# Patient Record
Sex: FEMALE | Race: OTHER | Employment: OTHER | URBAN - METROPOLITAN AREA
[De-identification: names, ages, dates, MRNs, and addresses within clinical notes are randomized per-mention and may not be internally consistent; named-entity substitution may affect disease eponyms.]

---

## 2022-03-18 ENCOUNTER — CONSULTATION/EVALUATION (OUTPATIENT)
Dept: URBAN - METROPOLITAN AREA CLINIC 39 | Facility: CLINIC | Age: 72
End: 2022-03-18

## 2022-03-18 DIAGNOSIS — H04.123: ICD-10-CM

## 2022-03-18 DIAGNOSIS — H25.812: ICD-10-CM

## 2022-03-18 DIAGNOSIS — H25.811: ICD-10-CM

## 2022-03-18 DIAGNOSIS — H18.513: ICD-10-CM

## 2022-03-18 DIAGNOSIS — D31.32: ICD-10-CM

## 2022-03-18 PROCEDURE — 92136TC INTERFEROMETRY - TECHNICAL COMPONENT

## 2022-03-18 PROCEDURE — 92134 CPTRZ OPH DX IMG PST SGM RTA: CPT

## 2022-03-18 PROCEDURE — V2799PMN IMPRIMIS PRED-MOXI-NEPAF 5ML

## 2022-03-18 PROCEDURE — 92015 DETERMINE REFRACTIVE STATE: CPT

## 2022-03-18 PROCEDURE — 92025AV CORNEAL TOPOGRAPHY, AV

## 2022-03-18 PROCEDURE — 92286 ANT SGM IMG I&R SPECLR MIC: CPT

## 2022-03-18 PROCEDURE — 92004 COMPRE OPH EXAM NEW PT 1/>: CPT

## 2022-03-18 ASSESSMENT — VISUAL ACUITY
OD_SC: 20/80-1
OD_RAM: 20/25+2
OS_SC: 20/200-1
OS_SC: J10
OD_SC: J10
OD_CC: 20/30-2
OS_CC: J5
OD_CC: J1+
OS_CC: 20/40-2
OD_BAT: <20/400
OS_AM: 20/20-1
OS_BAT: <20/400

## 2022-03-18 ASSESSMENT — TONOMETRY
OS_IOP_MMHG: 22
OD_IOP_MMHG: 18

## 2022-03-18 NOTE — PATIENT DISCUSSION
The patient expressed a desire to see through the full range of vision from distance, to middle, to near without glasses. The limitations of advanced lens technology were reviewed and the recommendation was made for the Synergy IOL OU. The patient understands there is no guarantee of glasses free vision and the more complete range of vision from the Synergy lens comes with a trade-off. Side effects include halos around point sources of light at night and failure to adapt in 1 in 500 cases resulting in the need for exchange of the lens. Enhancement, including Lasik, may be necessary to achieve the full uncorrected vision potential. The patient elects Synergy emmetropia, goal of emmetropia.

## 2022-03-18 NOTE — PATIENT DISCUSSION
Followed for 15 years by retina specialist in [facility]. Stable as of summer 2021. Patient to continue annual observation.

## 2022-03-25 ENCOUNTER — POST-OP (OUTPATIENT)
Dept: URBAN - METROPOLITAN AREA CLINIC 39 | Facility: CLINIC | Age: 72
End: 2022-03-25

## 2022-03-25 ENCOUNTER — PRE-OP/H&P (OUTPATIENT)
Dept: URBAN - METROPOLITAN AREA CLINIC 39 | Facility: CLINIC | Age: 72
End: 2022-03-25

## 2022-03-25 ENCOUNTER — SURGERY/PROCEDURE (OUTPATIENT)
Dept: URBAN - METROPOLITAN AREA CLINIC 39 | Facility: CLINIC | Age: 72
End: 2022-03-25

## 2022-03-25 DIAGNOSIS — Z96.1: ICD-10-CM

## 2022-03-25 DIAGNOSIS — H25.811: ICD-10-CM

## 2022-03-25 PROCEDURE — 66984AV REMOVE CATARACT, INSERT ADVANCED LENS

## 2022-03-25 PROCEDURE — 66999LNSR LENSAR LASER FOR CAT SX

## 2022-03-25 PROCEDURE — 99211T TECH SERVICE

## 2022-03-25 PROCEDURE — 65772LRI LRI DURING CAT SX

## 2022-03-25 ASSESSMENT — VISUAL ACUITY: OD_SC: 20/200

## 2022-03-25 ASSESSMENT — TONOMETRY: OD_IOP_MMHG: 16

## 2022-03-25 NOTE — PATIENT DISCUSSION
Followed for 15 years by retina specialist in [facility]. Stable as of summer 2021. Patient to continue annual observation.

## 2022-03-25 NOTE — PATIENT DISCUSSION
Followed for 15 years by retina specialist in [facility][note 3]. Stable as of summer 2021. Patient to continue annual observation.

## 2022-03-25 NOTE — PATIENT DISCUSSION
Followed for 15 years by retina specialist in [facility]. Stable as of summer 2021. Patient to continue annual observation.

## 2022-03-28 ENCOUNTER — POST OP/EVAL OF SECOND EYE (OUTPATIENT)
Dept: URBAN - METROPOLITAN AREA CLINIC 40 | Facility: CLINIC | Age: 72
End: 2022-03-28

## 2022-03-28 DIAGNOSIS — H25.812: ICD-10-CM

## 2022-03-28 DIAGNOSIS — H18.513: ICD-10-CM

## 2022-03-28 DIAGNOSIS — Z96.1: ICD-10-CM

## 2022-03-28 DIAGNOSIS — D31.32: ICD-10-CM

## 2022-03-28 DIAGNOSIS — H26.491: ICD-10-CM

## 2022-03-28 DIAGNOSIS — H04.123: ICD-10-CM

## 2022-03-28 PROCEDURE — 92012 INTRM OPH EXAM EST PATIENT: CPT

## 2022-03-28 PROCEDURE — 99024 POSTOP FOLLOW-UP VISIT: CPT

## 2022-03-28 ASSESSMENT — VISUAL ACUITY
OD_SC: J5
OS_BAT: <20/400
OD_SC: 20/40-1

## 2022-03-28 ASSESSMENT — TONOMETRY
OD_IOP_MMHG: 23
OS_IOP_MMHG: 20

## 2022-03-28 ASSESSMENT — PACHYMETRY
OS_CT_UM: 500
OD_CT_UM: 522

## 2022-03-28 NOTE — PATIENT DISCUSSION
OK to proceed with IOL OS due to FD OS pt elects to proceed and goal is for Synergy OS, goal of emmetropia.

## 2022-03-28 NOTE — PATIENT DISCUSSION
Followed for 15 years by retina specialist in [facility]. Stable as of summer 2021. Patient to continue annual observation.

## 2022-04-06 ENCOUNTER — SURGERY/PROCEDURE (OUTPATIENT)
Dept: URBAN - METROPOLITAN AREA CLINIC 39 | Facility: CLINIC | Age: 72
End: 2022-04-06

## 2022-04-06 ENCOUNTER — PRE-OP/H&P (OUTPATIENT)
Dept: URBAN - METROPOLITAN AREA CLINIC 39 | Facility: CLINIC | Age: 72
End: 2022-04-06

## 2022-04-06 DIAGNOSIS — H18.513: ICD-10-CM

## 2022-04-06 DIAGNOSIS — H26.491: ICD-10-CM

## 2022-04-06 DIAGNOSIS — D31.32: ICD-10-CM

## 2022-04-06 DIAGNOSIS — H04.123: ICD-10-CM

## 2022-04-06 DIAGNOSIS — H25.812: ICD-10-CM

## 2022-04-06 PROCEDURE — 99211T TECH SERVICE

## 2022-04-06 PROCEDURE — 66984AV REMOVE CATARACT, INSERT ADVANCED LENS

## 2022-04-06 ASSESSMENT — TONOMETRY: OD_IOP_MMHG: 17

## 2022-04-06 ASSESSMENT — VISUAL ACUITY
OS_SC: 20/200
OS_SC: J10
OD_SC: 20/25

## 2022-04-06 NOTE — PATIENT DISCUSSION
Followed for 15 years by retina specialist in [facility]. Stable as of summer 2021. Patient to continue annual observation.

## 2022-04-06 NOTE — PATIENT DISCUSSION
Followed for 15 years by retina specialist in [facility]. Stable as of summer 2021. Patient to continue annual observation.

## 2022-04-07 ENCOUNTER — POST-OP (OUTPATIENT)
Dept: URBAN - METROPOLITAN AREA CLINIC 39 | Facility: CLINIC | Age: 72
End: 2022-04-07

## 2022-04-07 DIAGNOSIS — Z96.1: ICD-10-CM

## 2022-04-07 PROCEDURE — 99024 POSTOP FOLLOW-UP VISIT: CPT

## 2022-04-07 ASSESSMENT — VISUAL ACUITY
OD_SC: 20/20
OS_SC: 20/40-1
OD_SC: J2
OS_SC: J3

## 2022-04-07 ASSESSMENT — TONOMETRY
OD_IOP_MMHG: 19
OS_IOP_MMHG: 31

## 2022-04-07 NOTE — PATIENT DISCUSSION
looks great x mild inc IOP.   add Simbz BID OS first gtts given in office today at 11:15 am.  pt very concerned with using a steroid - I ed MAY taper gtts every 5 days instead of every 7 days but we need steroid on board to moderate inflammatory response!

## 2022-04-07 NOTE — PATIENT DISCUSSION
Followed for 15 years by retina specialist in [facility]. Stable as of summer 2021. Patient to continue annual observation.

## 2022-04-15 ENCOUNTER — POST-OP (OUTPATIENT)
Dept: URBAN - METROPOLITAN AREA CLINIC 39 | Facility: CLINIC | Age: 72
End: 2022-04-15

## 2022-04-15 DIAGNOSIS — Z96.1: ICD-10-CM

## 2022-04-15 PROCEDURE — 99024 POSTOP FOLLOW-UP VISIT: CPT

## 2022-04-15 ASSESSMENT — TONOMETRY
OS_IOP_MMHG: 22
OD_IOP_MMHG: 17

## 2022-04-15 ASSESSMENT — VISUAL ACUITY: OS_PH: 20/40

## 2022-04-15 NOTE — PATIENT DISCUSSION
Followed for 15 years by retina specialist in [facility]. Stable as of summer 2021. Patient to continue annual observation.

## 2022-04-29 ENCOUNTER — POST-OP (OUTPATIENT)
Dept: URBAN - METROPOLITAN AREA CLINIC 39 | Facility: CLINIC | Age: 72
End: 2022-04-29

## 2022-04-29 DIAGNOSIS — Z96.1: ICD-10-CM

## 2022-04-29 PROCEDURE — 99024 POSTOP FOLLOW-UP VISIT: CPT

## 2022-04-29 ASSESSMENT — VISUAL ACUITY
OS_BAT: 20/40
OD_SC: 20/25+2
OU_SC: 20/20-1
OU: J2
OU_SC: J1
OD_SC: J1 @16"
OD_BAT: 20/40
OS_SC: 20/25+2

## 2022-04-29 ASSESSMENT — TONOMETRY
OS_IOP_MMHG: 16
OD_IOP_MMHG: 15

## 2022-04-29 NOTE — PATIENT DISCUSSION
Followed for 15 years by retina specialist in [facility]. Stable as of summer 2021. Patient to continue annual observation.

## 2022-04-29 NOTE — PATIENT DISCUSSION
4/29/22: doing very well no sig PCO, refractive result is excellent, and no CME.   reassurance heal over the summer and neuroadapt will do great.  pt is used to high mag in past with glasses and ed these lenses do NOT mag print it is a new way of seeing that must be learned.

## 2022-10-28 ENCOUNTER — COMPREHENSIVE EXAM (OUTPATIENT)
Dept: URBAN - METROPOLITAN AREA CLINIC 39 | Facility: CLINIC | Age: 72
End: 2022-10-28

## 2022-10-28 DIAGNOSIS — D31.32: ICD-10-CM

## 2022-10-28 DIAGNOSIS — H18.513: ICD-10-CM

## 2022-10-28 DIAGNOSIS — H04.123: ICD-10-CM

## 2022-10-28 DIAGNOSIS — H26.493: ICD-10-CM

## 2022-10-28 PROCEDURE — 92014 COMPRE OPH EXAM EST PT 1/>: CPT

## 2022-10-28 ASSESSMENT — VISUAL ACUITY
OD_SC: J1
OS_SC: 20/40
OD_BAT: 20/40
OD_SC: 20/40+2
OS_BAT: 20/50
OS_SC: J2 BLURRY

## 2022-10-28 ASSESSMENT — TONOMETRY
OD_IOP_MMHG: 16
OS_IOP_MMHG: 16

## 2022-10-28 NOTE — PATIENT DISCUSSION
10/28/2022: refractive outcome is stable.  see DWS and see notes in yellow sticky section at top of chart.

## 2022-10-28 NOTE — PATIENT DISCUSSION
Followed for 15 years by retina specialist in [facility]. Stable as of summer 2021. Patient to continue annual observation.

## 2022-10-31 ENCOUNTER — ESTABLISHED PATIENT (OUTPATIENT)
Dept: URBAN - METROPOLITAN AREA CLINIC 39 | Facility: CLINIC | Age: 72
End: 2022-10-31

## 2022-10-31 DIAGNOSIS — H26.493: ICD-10-CM

## 2022-10-31 DIAGNOSIS — Z96.1: ICD-10-CM

## 2022-10-31 PROCEDURE — 99024 POSTOP FOLLOW-UP VISIT: CPT

## 2022-10-31 ASSESSMENT — VISUAL ACUITY
OD_BAT: 20/40
OD_SC: 20/25-2
OD_SC: J1
OS_SC: 20/40+2
OS_SC: J2
OS_BAT: 20/50

## 2022-10-31 ASSESSMENT — TONOMETRY
OS_IOP_MMHG: 16
OD_IOP_MMHG: 16

## 2022-10-31 NOTE — PATIENT DISCUSSION
Followed for 15 years by retina specialist in [facility]. Stable as of summer 2021. Patient to continue annual observation.

## 2022-10-31 NOTE — PATIENT DISCUSSION
"10/31/22: Patient unhappy with near and distance vision OS since surgery. Is very happy with overall vision right eye.  Says she has ""moments of clarity"" OS Patient had 2nd opinion from  in [place]
